# Patient Record
Sex: FEMALE | Race: WHITE | HISPANIC OR LATINO
[De-identification: names, ages, dates, MRNs, and addresses within clinical notes are randomized per-mention and may not be internally consistent; named-entity substitution may affect disease eponyms.]

---

## 2017-01-29 PROBLEM — Z00.00 ENCOUNTER FOR PREVENTIVE HEALTH EXAMINATION: Status: ACTIVE | Noted: 2017-01-29

## 2017-02-27 ENCOUNTER — APPOINTMENT (OUTPATIENT)
Dept: ENDOCRINOLOGY | Facility: CLINIC | Age: 52
End: 2017-02-27

## 2019-12-17 ENCOUNTER — APPOINTMENT (OUTPATIENT)
Dept: ORTHOPEDIC SURGERY | Facility: CLINIC | Age: 54
End: 2019-12-17

## 2020-01-12 DIAGNOSIS — M25.561 PAIN IN RIGHT KNEE: ICD-10-CM

## 2020-01-22 ENCOUNTER — APPOINTMENT (OUTPATIENT)
Dept: ORTHOPEDIC SURGERY | Facility: CLINIC | Age: 55
End: 2020-01-22
Payer: MEDICARE

## 2020-01-22 VITALS — HEIGHT: 64 IN | BODY MASS INDEX: 23.9 KG/M2 | WEIGHT: 140 LBS

## 2020-01-22 DIAGNOSIS — Z86.69 PERSONAL HISTORY OF OTHER DISEASES OF THE NERVOUS SYSTEM AND SENSE ORGANS: ICD-10-CM

## 2020-01-22 DIAGNOSIS — Z78.9 OTHER SPECIFIED HEALTH STATUS: ICD-10-CM

## 2020-01-22 DIAGNOSIS — Z86.79 PERSONAL HISTORY OF OTHER DISEASES OF THE CIRCULATORY SYSTEM: ICD-10-CM

## 2020-01-22 DIAGNOSIS — Z80.42 FAMILY HISTORY OF MALIGNANT NEOPLASM OF PROSTATE: ICD-10-CM

## 2020-01-22 DIAGNOSIS — Z80.3 FAMILY HISTORY OF MALIGNANT NEOPLASM OF BREAST: ICD-10-CM

## 2020-01-22 DIAGNOSIS — Z87.39 PERSONAL HISTORY OF OTHER DISEASES OF THE MUSCULOSKELETAL SYSTEM AND CONNECTIVE TISSUE: ICD-10-CM

## 2020-01-22 PROCEDURE — 99215 OFFICE O/P EST HI 40 MIN: CPT

## 2020-01-22 PROCEDURE — 73564 X-RAY EXAM KNEE 4 OR MORE: CPT | Mod: RT

## 2020-01-22 PROCEDURE — 73560 X-RAY EXAM OF KNEE 1 OR 2: CPT | Mod: LT

## 2020-01-22 RX ORDER — CYCLOBENZAPRINE HCL 10 MG
10 TABLET ORAL
Refills: 0 | Status: ACTIVE | COMMUNITY

## 2020-01-22 RX ORDER — LISINOPRIL 2.5 MG/1
2.5 TABLET ORAL
Refills: 0 | Status: ACTIVE | COMMUNITY

## 2020-01-22 RX ORDER — GABAPENTIN 400 MG
400 TABLET ORAL
Refills: 0 | Status: ACTIVE | COMMUNITY

## 2020-01-22 RX ORDER — DICYCLOMINE HYDROCHLORIDE 10 MG/1
CAPSULE ORAL
Refills: 0 | Status: ACTIVE | COMMUNITY

## 2020-01-22 RX ORDER — PANTOPRAZOLE SODIUM 40 MG/1
40 GRANULE, DELAYED RELEASE ORAL
Refills: 0 | Status: ACTIVE | COMMUNITY

## 2020-01-22 RX ORDER — DICLOFENAC SODIUM 1 %
KIT TOPICAL
Refills: 0 | Status: ACTIVE | COMMUNITY

## 2020-01-22 RX ORDER — OXYCODONE HYDROCHLORIDE AND ACETAMINOPHEN 10; 325 MG/1; MG/1
TABLET ORAL
Refills: 0 | Status: ACTIVE | COMMUNITY

## 2020-01-22 NOTE — PHYSICAL EXAM
[de-identified] : Left knee xray merchant view, taken at the office today demonstrates good joint space and a well centered patella. \par \par Right knee xrays, standing AP/Lateral, Merchant, and 45 degree PA standing view, taken at the office today shows normal alignment, good joint space maintained, patella sits at an appropriate height in a central position \par \par MRI Right knee taken 12/2/2019: Films brought in and reviewed, see attached report. I agree with the radiologist findings consistent with mild degenerative changes, no meniscal or ligament tear seen [de-identified] : General appearance: well nourished and hydrated, pleasant, alert and oriented x 3, cooperative.\par HEENT: Normocephalic, EOM intact, Nasal septum midline, Oral cavity clear, External auditory canal clear.\par Cardiovascular: no apparent abnormalities, no lower leg edema, no varicosities, pedal pulses are palpable.\par Lymphatics Lymph nodes: none palpated, Lymphedema: not present.\par Neurologic: decreased sensation throughout left leg, foot and ankle through to the thigh, otherwise sensation is normal, weakness to toe extensors on the left, otherwise no muscle weakness in upper or lower extremities, patella tendon reflexes intact .\par Dermatologic no apparent skin lesions, moist, warm, no rash.\par Spine: limited rotational movement at cervical spine with a healed anterior incision on the right, thoracic spine appears normal and moves freely, limited mobility at lumbosacral spine with tenderness \par Gait: nonantalgic.\par \par Left knee\par Inspection: no effusion or erythema.\par Wounds: none.\par Alignment: normal.\par Palpation: no specific tenderness on palpation.\par ROM active (in degrees): 0-145 with crepitus \par Ligamentous laxity: all ligaments appear stable,, negative ant. drawer test, negative post. drawer test, stable to varus stress test, stable to valgus stress test. negative Lachman's test, negative pivot shift test\par Meniscal Test: negative McMurrays, negative Moo.\par Patellofemoral Alignment Test: Q angle-, normal.\par Muscle Test: good quad strength.\par Leg examination: calf is soft and non-tender.\par tight hamstring, popliteal angle 45 degrees, positive Tyler test, tight IT band\par \par Right knee\par Inspection: no effusion or erythema.\par Wounds: none.\par Alignment: normal.\par Palpation: medial and lateral tenderness on palpation, tender over popliteal fossa\par ROM active (in degrees): 0-115 with mild discomfort on extremes of flexion\par Ligamentous laxity: all ligaments appear stable,, negative ant. drawer test, negative post. drawer test, stable to varus stress test, stable to valgus stress test. negative Lachman's test, negative pivot shift test\par Meniscal Test: negative McMurrays, negative Moo.\par Patellofemoral Alignment Test: Q angle-, normal.\par Muscle Test: good quad strength.\par Leg examination: calf is soft and non-tender.\par tight hamstring, popliteal angle 45 degrees, positive Tyler test, tight IT band \par \par Left hip\par Inspection: No swelling or ecchymosis.\par Wounds: none.\par Palpation: non-tender.\par Stability: no instability.\par Strength: 5/5 all motor groups.\par ROM: no pain with FROM.\par Leg length: equal.\par \par Right hip\par Inspection: No swelling or ecchymosis.\par Wounds: none.\par Palpation: non-tender.\par Stability: no instability.\par Strength: 5/5 all motor groups.\par ROM: no pain with FROM.\par Leg length: equal.\par \par Left ankle\par Inspection: no erythema noted, no swelling noted.\par Palpation: no pain on palpation .\par ROM:  tight heel cords with limited dorsiflexion, otherwise \par Muscle strength: 5/5.\par Stability: no instability noted.\par \par Right ankle\par Inspection: no erythema noted, no swelling noted.\par ROM: tight heel cords with limited dorsiflexion, otherwise FROM without crepitus.\par Palpation: no pain on palpation .\par Muscle strength: 5/5.\par Stability: no instability noted.\par \par Left foot\par Inspection: color, texture and turgor are normal.\par ROM:  full range of motion of all joints without pain or crepitus.\par Palpation: no tenderness.\par Stability: no instability noted.\par \par Right foot\par Inspection: color, texture and turgor are normal.\par ROM: full range of motion of all joints without pain or crepitus.\par Palpation: no tenderness.\par Stability: no instability noted.

## 2020-01-22 NOTE — ADDENDUM
[FreeTextEntry1] : This note was written by Rhina Rebollar on 01/22/2020 acting as scribe for Dr. Irwin Ji M.D.\par \par I, Dr. Irwin Ji M.D., have read and attest that all the information, medical decision making and discharge instructions within are true and accurate.

## 2020-01-22 NOTE — HISTORY OF PRESENT ILLNESS
[de-identified] : 54 year old female presents for initial evaluation of right knee pain for the last 2 months. Patient denies any specific injury, but reports her right knee pain began [place] the [place]. She reports her knee also became swollen. She had an appointment with her neurologist, who referred her to a rheumatologist. She then had a right knee aspiration and US guided steroid that improved the pain and swelling. Several days later the pain and swelling returned, which she localizes into the posterior aspect of the joint. Her pain is a dull, constant, throbbing, pain associated with swelling, buckling, locking, and loss of motion. She can walk 1-2 blocks with a cane and takes the stairs one at a time using the handrail. She is currently under care of pain management on Gabapentin, Flexeril, and Percocet for nerve pain and back pain. She also applies Voltaren Gel to her knee with some relief. She had a right knee MRI with Dr. Ba, her rheumatologist. She does have left leg peripheral neuropathy, as well as paresthesias to her left foot and ankle. She is also s/p cervical spine fusion with Dr. Vasquez Reddy. Patient is currently undergoing PT for her back. Today, she would like to discuss her treatment options with Dr. Ji.

## 2020-01-22 NOTE — DISCUSSION/SUMMARY
[de-identified] : Discussed at length the nature of the patient’s condition. Their right knee symptoms appear to be related to degenerative arthritis, though I believe her symptoms are primarily related to her back with  a lumbar radiculopathy and tight musculature. At this point I think that the best option is to begin a course of physiotherapy following the anterior knee pain program and focusing on flexibility. I have recommended Yoga and Pilates, as well as daily stretching. She may continue with her low Continue Diclofenac gel. She should follow up with her spine specialist and rheumatologist. She may follow up prn.

## 2022-02-04 ENCOUNTER — NON-APPOINTMENT (OUTPATIENT)
Age: 57
End: 2022-02-04

## 2022-02-04 ENCOUNTER — APPOINTMENT (OUTPATIENT)
Dept: ORTHOPEDIC SURGERY | Facility: CLINIC | Age: 57
End: 2022-02-04
Payer: MEDICARE

## 2022-02-04 VITALS — BODY MASS INDEX: 24.59 KG/M2 | HEIGHT: 64 IN | WEIGHT: 144 LBS

## 2022-02-04 DIAGNOSIS — S83.241A OTHER TEAR OF MEDIAL MENISCUS, CURRENT INJURY, RIGHT KNEE, INITIAL ENCOUNTER: ICD-10-CM

## 2022-02-04 DIAGNOSIS — M54.16 RADICULOPATHY, LUMBAR REGION: ICD-10-CM

## 2022-02-04 DIAGNOSIS — M65.9 SYNOVITIS AND TENOSYNOVITIS, UNSPECIFIED: ICD-10-CM

## 2022-02-04 PROCEDURE — 73564 X-RAY EXAM KNEE 4 OR MORE: CPT | Mod: RT

## 2022-02-04 PROCEDURE — 99214 OFFICE O/P EST MOD 30 MIN: CPT

## 2022-02-04 NOTE — DISCUSSION/SUMMARY
[de-identified] : Discussed at length the nature of the patient’s condition. Their right knee symptoms appear secondary to an acute exacerbation of degenerative arthritis and a possible meniscal tear.\par \par Suggested we obtain an MRI of the right knee to rule out intra articular pathology. If MRI shows a torn meniscus, we will give thought to a surgical arthroscopy, which was discussed. When results are available, we will discuss further. \par \par In the interim, I suggested PT, Meloxicam and cane for ambulation. I did inform her that a component of her pain could be realted to a right lumbar radiculopathy.

## 2022-02-04 NOTE — PHYSICAL EXAM
[de-identified] : General appearance: well nourished and hydrated, pleasant, alert and oriented x 3, cooperative.\par HEENT: Normocephalic, EOM intact, Nasal septum midline, Oral cavity clear, External auditory canal clear.\par Cardiovascular: no apparent abnormalities, no lower leg edema, no varicosities, pedal pulses are palpable.\par Lymphatics Lymph nodes: none palpated, Lymphedema: not present.\par Neurologic: sensation is normal, no muscle weakness in upper or lower extremities, patella tendon reflexes intact .\par Dermatologic no apparent skin lesions, moist, warm, no rash.\par Spine:cervical spine appears normal and moves freely, thoracic spine appears normal and moves freely, lumbosacral spine appears normal and moves freely.\par Gait: right antalgic.\par \par Right Knee\par Inspection: mild effusion, ecchymosis over the lateral retinaculum\par Wounds: none.\par Alignment: normal.\par Palpation: medial and lateral tenderness on palpation.\par ROM: Active (in degrees): 0-90\par Ligamentous laxity (neg): all ligaments appear stable, negative ant. drawer test, negative post. drawer test, stable to varus stress test, stable to valgus stress test, negative Lachman's test, negative pivot shift test,\par Meniscal Test: negative McMurrays, negative Moo.\par Patellofemoral Alignment Test: Q angle-, normal.\par Muscle Test: good quad strength.\par Leg examination: calf is soft and non-tender. [de-identified] : Right knee xrays, standing AP/Lateral and Merchant films, and 45 degree PA standing view, taken at the office today shows normal alignment degenerative arthritis, medial joint space narrowing, patella at appropriate height, Kellgren and Haseeb grade 1-2\par \par Left knee xray merchant view taken at the office today demonstrates good joint space and a well centered patella.

## 2022-02-04 NOTE — HISTORY OF PRESENT ILLNESS
[de-identified] : SCOTT JOHNSON is a 56 year old female who presents for follow up evaluation of right knee arthritis, most likely related to her lumbar radiculopathy and tight musculature. At the last visit she was recommended to go to PT following the [facility] protocol but did not go. She stated after her last visit she developed an effusion in May 2021. She saw her Neuro surgeon who sent her to an orthopedic doctor who aspirated the right knee and gave her a right knee cortisone injection. Pt developed an infection in the lower back in Aug 2021. Pt states that the pain is posterior and anterior. Takes Gabapentin. Uses a cane prn.

## 2022-02-04 NOTE — ADDENDUM
[FreeTextEntry1] : This note was written by Kay Moreno on 02/04/2022 acting as scribe for Dr. Irwin Ji M.D.\par \par I, Dr. Irwin Ji, have read and attest that all the information, medical decision making and discharge instructions within are true and accurate.

## 2022-02-19 ENCOUNTER — APPOINTMENT (OUTPATIENT)
Dept: MRI IMAGING | Facility: CLINIC | Age: 57
End: 2022-02-19

## 2022-03-14 ENCOUNTER — APPOINTMENT (OUTPATIENT)
Dept: ORTHOPEDIC SURGERY | Facility: CLINIC | Age: 57
End: 2022-03-14
Payer: MEDICARE

## 2022-03-14 PROCEDURE — G2012 BRIEF CHECK IN BY MD/QHP: CPT

## 2022-04-26 ENCOUNTER — RX RENEWAL (OUTPATIENT)
Age: 57
End: 2022-04-26

## 2022-04-27 ENCOUNTER — APPOINTMENT (OUTPATIENT)
Dept: ORTHOPEDIC SURGERY | Facility: CLINIC | Age: 57
End: 2022-04-27

## 2022-05-16 ENCOUNTER — APPOINTMENT (OUTPATIENT)
Dept: ORTHOPEDIC SURGERY | Facility: CLINIC | Age: 57
End: 2022-05-16

## 2022-08-04 ENCOUNTER — RX RENEWAL (OUTPATIENT)
Age: 57
End: 2022-08-04

## 2022-09-01 ENCOUNTER — APPOINTMENT (OUTPATIENT)
Dept: VASCULAR SURGERY | Facility: CLINIC | Age: 57
End: 2022-09-01

## 2022-09-01 VITALS — HEART RATE: 73 BPM | TEMPERATURE: 98.4 F | DIASTOLIC BLOOD PRESSURE: 64 MMHG | SYSTOLIC BLOOD PRESSURE: 93 MMHG

## 2022-09-01 VITALS
WEIGHT: 142 LBS | SYSTOLIC BLOOD PRESSURE: 112 MMHG | HEART RATE: 73 BPM | DIASTOLIC BLOOD PRESSURE: 74 MMHG | BODY MASS INDEX: 24.24 KG/M2 | HEIGHT: 64 IN

## 2022-09-01 DIAGNOSIS — I83.893 VARICOSE VEINS OF BILATERAL LOWER EXTREMITIES WITH OTHER COMPLICATIONS: ICD-10-CM

## 2022-09-01 PROCEDURE — 93970 EXTREMITY STUDY: CPT

## 2022-09-01 PROCEDURE — 99204 OFFICE O/P NEW MOD 45 MIN: CPT

## 2022-11-01 ENCOUNTER — RX RENEWAL (OUTPATIENT)
Age: 57
End: 2022-11-01

## 2023-01-30 ENCOUNTER — RX RENEWAL (OUTPATIENT)
Age: 58
End: 2023-01-30

## 2023-05-04 ENCOUNTER — RX RENEWAL (OUTPATIENT)
Age: 58
End: 2023-05-04

## 2023-07-17 ENCOUNTER — RX RENEWAL (OUTPATIENT)
Age: 58
End: 2023-07-17

## 2023-10-18 ENCOUNTER — RX RENEWAL (OUTPATIENT)
Age: 58
End: 2023-10-18

## 2024-01-18 ENCOUNTER — RX RENEWAL (OUTPATIENT)
Age: 59
End: 2024-01-18

## 2024-01-18 RX ORDER — MELOXICAM 7.5 MG/1
7.5 TABLET ORAL
Qty: 30 | Refills: 2 | Status: ACTIVE | COMMUNITY
Start: 2022-02-04 | End: 1900-01-01

## 2024-03-11 ENCOUNTER — APPOINTMENT (OUTPATIENT)
Dept: ORTHOPEDIC SURGERY | Facility: CLINIC | Age: 59
End: 2024-03-11
Payer: MEDICARE

## 2024-03-11 VITALS — WEIGHT: 132 LBS | HEIGHT: 64 IN | BODY MASS INDEX: 22.53 KG/M2

## 2024-03-11 DIAGNOSIS — M17.11 UNILATERAL PRIMARY OSTEOARTHRITIS, RIGHT KNEE: ICD-10-CM

## 2024-03-11 DIAGNOSIS — G57.91 UNSPECIFIED MONONEUROPATHY OF RIGHT LOWER LIMB: ICD-10-CM

## 2024-03-11 PROCEDURE — 99214 OFFICE O/P EST MOD 30 MIN: CPT

## 2024-03-11 PROCEDURE — 73564 X-RAY EXAM KNEE 4 OR MORE: CPT | Mod: RT

## 2024-03-11 RX ORDER — IBUPROFEN AND FAMOTIDINE 26.6; 8 MG/1; MG/1
800-26.6 TABLET, COATED ORAL 3 TIMES DAILY
Qty: 90 | Refills: 2 | Status: DISCONTINUED | COMMUNITY
Start: 2024-03-11 | End: 2024-03-11

## 2024-03-11 RX ORDER — FAMOTIDINE 20 MG/1
20 TABLET, FILM COATED ORAL
Qty: 60 | Refills: 0 | Status: ACTIVE | COMMUNITY
Start: 2024-03-11 | End: 1900-01-01

## 2024-03-13 NOTE — DISCUSSION/SUMMARY
[de-identified] : Patient's symptoms are more neuropathic and secondary to atrophy of quadricep muscle weakness with underlying degenerative arthritis. I do not think their knee is the root cause of problem. I suggested the patient see a neurologist for a complete evaluation. I also suggested homes exercise and PT. They can continue activities as tolerated. Patient is using a cane for ambulation.   I will see them back in 3 months or sooner if any acute problems.

## 2024-03-13 NOTE — CONSULT LETTER
[Consult Letter:] : I had the pleasure of evaluating your patient, [unfilled]. [Dear  ___] : Dear  [unfilled], [Please see my note below.] : Please see my note below. [Consult Closing:] : Thank you very much for allowing me to participate in the care of this patient.  If you have any questions, please do not hesitate to contact me. [FreeTextEntry3] : Dr. Irwin Ji  [Sincerely,] : Sincerely,

## 2024-03-13 NOTE — PHYSICAL EXAM
[de-identified] : Right knee xrays, standing AP/Lateral and Merchant films, and 45 degree PA standing view, taken at the office today shows normal alignment degenerative arthritis, medial joint space narrowing, patella at appropriate height, Kellgren and Haseeb grade 1-2\par  \par  Left knee xray merchant view taken at the office today demonstrates good joint space and a well centered patella. [de-identified] : General appearance: well nourished and hydrated, pleasant, alert and oriented x 3, cooperative. HEENT: Normocephalic, EOM intact, Nasal septum midline, Oral cavity clear, External auditory canal clear. Cardiovascular: no apparent abnormalities, no lower leg edema, no varicosities, pedal pulses are palpable. Lymphatics Lymph nodes: none palpated, Lymphedema: not present. Neurologic: sensation is normal, no muscle weakness in upper or lower extremities, patella tendon reflexes intact . Dermatologic no apparent skin lesions, moist, warm, no rash. Spine:cervical spine appears normal and moves freely, thoracic spine appears normal and moves freely, lumbosacral spine appears normal and moves freely. Gait: right antalgic.  Right Knee Inspection: mild effusion, ecchymosis over the lateral retinaculum Wounds: none. Alignment: normal. Palpation: medial and lateral tenderness on palpation. Tenderness of the vastus medialis and popliteal fossa.  ROM: Active (in degrees): 0-135 with apprehension during exam.  Ligamentous laxity (neg): all ligaments appear stable, negative ant. drawer test, negative post. drawer test, stable to varus stress test, stable to valgus stress test, negative Lachman's test, negative pivot shift test, Meniscal Test: negative McMurrays, negative Moo. Patellofemoral Alignment Test: Q angle-, normal. Muscle Test: atrophy of quadricep  Leg examination: calf strength is 4/5. Hamstring is 4/5.

## 2024-03-13 NOTE — ADDENDUM
[FreeTextEntry1] : This note was written by Carmen Boyer on 03/13/2024 acting as scribe for Dr. Irwin Ji M.D.   I, Dr. Irwin Ji, have read and attest that all the information, medical decision making and discharge instructions within are true and accurate.

## 2024-03-13 NOTE — HISTORY OF PRESENT ILLNESS
[de-identified] : SCOTT JOHNSON  58 year old female presents for evaluation of right knee pain. At her last visit she was prescribed PT and Mobic. She notes having intermittent pains which she would rest and take the Meloxicam which the pain would resolve. Approximately one and a half weeks ago she started having increased pain in the right knee with transitions and long standing. She has a history of ambulating with a cane when she is outside and uses a walker at home. She also notes her daughter has to assist her with transitions. Patient states the pain is constant and diffused in nature.

## 2024-04-08 ENCOUNTER — RX RENEWAL (OUTPATIENT)
Age: 59
End: 2024-04-08

## 2024-05-10 ENCOUNTER — RX RENEWAL (OUTPATIENT)
Age: 59
End: 2024-05-10

## 2024-06-06 ENCOUNTER — RX RENEWAL (OUTPATIENT)
Age: 59
End: 2024-06-06

## 2024-06-06 RX ORDER — IBUPROFEN 800 MG/1
800 TABLET, FILM COATED ORAL
Qty: 90 | Refills: 0 | Status: ACTIVE | COMMUNITY
Start: 2024-03-11 | End: 1900-01-01

## 2024-06-10 ENCOUNTER — APPOINTMENT (OUTPATIENT)
Dept: ORTHOPEDIC SURGERY | Facility: CLINIC | Age: 59
End: 2024-06-10

## 2024-06-21 ENCOUNTER — APPOINTMENT (OUTPATIENT)
Dept: ORTHOPEDIC SURGERY | Facility: CLINIC | Age: 59
End: 2024-06-21

## 2024-07-08 ENCOUNTER — RX RENEWAL (OUTPATIENT)
Age: 59
End: 2024-07-08

## 2024-08-12 ENCOUNTER — RX RENEWAL (OUTPATIENT)
Age: 59
End: 2024-08-12